# Patient Record
Sex: FEMALE | Race: WHITE | NOT HISPANIC OR LATINO | Employment: FULL TIME | URBAN - METROPOLITAN AREA
[De-identification: names, ages, dates, MRNs, and addresses within clinical notes are randomized per-mention and may not be internally consistent; named-entity substitution may affect disease eponyms.]

---

## 2024-04-11 ENCOUNTER — OFFICE VISIT (OUTPATIENT)
Dept: FAMILY MEDICINE | Facility: CLINIC | Age: 29
End: 2024-04-11
Payer: COMMERCIAL

## 2024-04-11 ENCOUNTER — PATIENT OUTREACH (OUTPATIENT)
Dept: ONCOLOGY | Facility: CLINIC | Age: 29
End: 2024-04-11

## 2024-04-11 VITALS
RESPIRATION RATE: 16 BRPM | HEART RATE: 79 BPM | OXYGEN SATURATION: 97 % | BODY MASS INDEX: 23.23 KG/M2 | WEIGHT: 131.1 LBS | TEMPERATURE: 98.4 F | SYSTOLIC BLOOD PRESSURE: 126 MMHG | HEIGHT: 63 IN | DIASTOLIC BLOOD PRESSURE: 75 MMHG

## 2024-04-11 DIAGNOSIS — Z80.3 FAMILY HISTORY OF MALIGNANT NEOPLASM OF BREAST: ICD-10-CM

## 2024-04-11 DIAGNOSIS — Z00.00 ROUTINE GENERAL MEDICAL EXAMINATION AT A HEALTH CARE FACILITY: Primary | ICD-10-CM

## 2024-04-11 DIAGNOSIS — Z11.59 NEED FOR HEPATITIS C SCREENING TEST: ICD-10-CM

## 2024-04-11 DIAGNOSIS — Z12.4 CERVICAL CANCER SCREENING: ICD-10-CM

## 2024-04-11 DIAGNOSIS — F41.9 ANXIETY: ICD-10-CM

## 2024-04-11 DIAGNOSIS — Z11.4 SCREENING FOR HIV (HUMAN IMMUNODEFICIENCY VIRUS): ICD-10-CM

## 2024-04-11 LAB — T PALLIDUM AB SER QL: NONREACTIVE

## 2024-04-11 PROCEDURE — 99213 OFFICE O/P EST LOW 20 MIN: CPT | Mod: 25 | Performed by: FAMILY MEDICINE

## 2024-04-11 PROCEDURE — 90715 TDAP VACCINE 7 YRS/> IM: CPT | Performed by: FAMILY MEDICINE

## 2024-04-11 PROCEDURE — 86780 TREPONEMA PALLIDUM: CPT | Performed by: FAMILY MEDICINE

## 2024-04-11 PROCEDURE — 87389 HIV-1 AG W/HIV-1&-2 AB AG IA: CPT | Performed by: FAMILY MEDICINE

## 2024-04-11 PROCEDURE — 87591 N.GONORRHOEAE DNA AMP PROB: CPT | Performed by: FAMILY MEDICINE

## 2024-04-11 PROCEDURE — 36415 COLL VENOUS BLD VENIPUNCTURE: CPT | Performed by: FAMILY MEDICINE

## 2024-04-11 PROCEDURE — 99385 PREV VISIT NEW AGE 18-39: CPT | Mod: 25 | Performed by: FAMILY MEDICINE

## 2024-04-11 PROCEDURE — 90471 IMMUNIZATION ADMIN: CPT | Performed by: FAMILY MEDICINE

## 2024-04-11 PROCEDURE — 87491 CHLMYD TRACH DNA AMP PROBE: CPT | Performed by: FAMILY MEDICINE

## 2024-04-11 PROCEDURE — G0145 SCR C/V CYTO,THINLAYER,RESCR: HCPCS | Performed by: FAMILY MEDICINE

## 2024-04-11 PROCEDURE — 86803 HEPATITIS C AB TEST: CPT | Performed by: FAMILY MEDICINE

## 2024-04-11 RX ORDER — CITALOPRAM HYDROBROMIDE 10 MG/1
10 TABLET ORAL DAILY
Qty: 90 TABLET | Refills: 0 | Status: SHIPPED | OUTPATIENT
Start: 2024-04-11 | End: 2024-07-07

## 2024-04-11 RX ORDER — LEVETIRACETAM 250 MG/1
200 TABLET ORAL 2 TIMES DAILY
COMMUNITY

## 2024-04-11 SDOH — HEALTH STABILITY: PHYSICAL HEALTH: ON AVERAGE, HOW MANY DAYS PER WEEK DO YOU ENGAGE IN MODERATE TO STRENUOUS EXERCISE (LIKE A BRISK WALK)?: 1 DAY

## 2024-04-11 ASSESSMENT — PAIN SCALES - GENERAL: PAINLEVEL: NO PAIN (0)

## 2024-04-11 ASSESSMENT — SOCIAL DETERMINANTS OF HEALTH (SDOH): HOW OFTEN DO YOU GET TOGETHER WITH FRIENDS OR RELATIVES?: MORE THAN THREE TIMES A WEEK

## 2024-04-11 NOTE — NURSING NOTE
Prior to immunization administration, verified patients identity using patient s name and date of birth. Please see Immunization Activity for additional information.     Screening Questionnaire for Adult Immunization    Are you sick today?   No   Do you have allergies to medications, food, a vaccine component or latex?   No   Have you ever had a serious reaction after receiving a vaccination?   No   Do you have a long-term health problem with heart, lung, kidney, or metabolic disease (e.g., diabetes), asthma, a blood disorder, no spleen, complement component deficiency, a cochlear implant, or a spinal fluid leak?  Are you on long-term aspirin therapy?   No   Do you have cancer, leukemia, HIV/AIDS, or any other immune system problem?   No   Do you have a parent, brother, or sister with an immune system problem?   No   In the past 3 months, have you taken medications that affect  your immune system, such as prednisone, other steroids, or anticancer drugs; drugs for the treatment of rheumatoid arthritis, Crohn s disease, or psoriasis; or have you had radiation treatments?   No   Have you had a seizure, or a brain or other nervous system problem?   No   During the past year, have you received a transfusion of blood or blood    products, or been given immune (gamma) globulin or antiviral drug?   No   For women: Are you pregnant or is there a chance you could become       pregnant during the next month?   No   Have you received any vaccinations in the past 4 weeks?   No     Immunization questionnaire answers were all negative.      Patient instructed to remain in clinic for 15 minutes afterwards, and to report any adverse reactions.     Screening performed by Natan Peres MA on 4/11/2024 at 12:22 PM.

## 2024-04-11 NOTE — PATIENT INSTRUCTIONS
Preventive Care Advice   This is general advice given by our system to help you stay healthy. However, your care team may have specific advice just for you. Please talk to your care team about your preventive care needs.  Nutrition  Eat 5 or more servings of fruits and vegetables each day.  Try wheat bread, brown rice and whole grain pasta (instead of white bread, rice, and pasta).  Get enough calcium and vitamin D. Check the label on foods and aim for 100% of the RDA (recommended daily allowance).  Lifestyle  Exercise at least 150 minutes each week   (30 minutes a day, 5 days a week).  Do muscle strengthening activities 2 days a week. These help control your weight and prevent disease.  No smoking.  Wear sunscreen to prevent skin cancer.  Have a dental exam and cleaning every 6 months.  Yearly exams  See your health care team every year to talk about:  Any changes in your health.  Any medicines your care team has prescribed.  Preventive care, family planning, and ways to prevent chronic diseases.  Shots (vaccines)   HPV shots (up to age 26), if you've never had them before.  Hepatitis B shots (up to age 59), if you've never had them before.  COVID-19 shot: Get this shot when it's due.  Flu shot: Get a flu shot every year.  Tetanus shot: Get a tetanus shot every 10 years.  Pneumococcal, hepatitis A, and RSV shots: Ask your care team if you need these based on your risk.  Shingles shot (for age 50 and up).  General health tests  Diabetes screening:  Starting at age 35, Get screened for diabetes at least every 3 years.  If you are younger than age 35, ask your care team if you should be screened for diabetes.  Cholesterol test: At age 39, start having a cholesterol test every 5 years, or more often if advised.  Bone density scan (DEXA): At age 50, ask your care team if you should have this scan for osteoporosis (brittle bones).  Hepatitis C: Get tested at least once in your life.  STIs (sexually transmitted  infections)  Before age 24: Ask your care team if you should be screened for STIs.  After age 24: Get screened for STIs if you're at risk. You are at risk for STIs (including HIV) if:  You are sexually active with more than one person.  You don't use condoms every time.  You or a partner was diagnosed with a sexually transmitted infection.  If you are at risk for HIV, ask about PrEP medicine to prevent HIV.  Get tested for HIV at least once in your life, whether you are at risk for HIV or not.  Cancer screening tests  Cervical cancer screening: If you have a cervix, begin getting regular cervical cancer screening tests at age 21. Most people who have regular screenings with normal results can stop after age 65. Talk about this with your provider.  Breast cancer scan (mammogram): If you've ever had breasts, begin having regular mammograms starting at age 40. This is a scan to check for breast cancer.  Colon cancer screening: It is important to start screening for colon cancer at age 45.  Have a colonoscopy test every 10 years (or more often if you're at risk) Or, ask your provider about stool tests like a FIT test every year or Cologuard test every 3 years.  To learn more about your testing options, visit: https://www.[employer]/860597.pdf.  For help making a decision, visit: https://bit.ly/pb50786.  Prostate cancer screening test: If you have a prostate and are age 55 to 69, ask your provider if you would benefit from a yearly prostate cancer screening test.  Lung cancer screening: If you are a current or former smoker age 50 to 80, ask your care team if ongoing lung cancer screenings are right for you.  For informational purposes only. Not to replace the advice of your health care provider. Copyright   2023 [employer]. All rights reserved. Clinically reviewed by the [facility]. [employer] [id number] - REV 01/24.    Learning About Stress  What is stress?     Stress is your  body's response to a hard situation. Your body can have a physical, emotional, or mental response. Stress is a fact of life for most people, and it affects everyone differently. What causes stress for you may not be stressful for someone else.  A lot of things can cause stress. You may feel stress when you go on a job interview, take a test, or run a race. This kind of short-term stress is normal and even useful. It can help you if you need to work hard or react quickly. For example, stress can help you finish an important job on time.  Long-term stress is caused by ongoing stressful situations or events. Examples of long-term stress include long-term health problems, ongoing problems at work, or conflicts in your family. Long-term stress can harm your health.  How does stress affect your health?  When you are stressed, your body responds as though you are in danger. It makes hormones that speed up your heart, make you breathe faster, and give you a burst of energy. This is called the fight-or-flight stress response. If the stress is over quickly, your body goes back to normal and no harm is done.  But if stress happens too often or lasts too long, it can have bad effects. Long-term stress can make you more likely to get sick, and it can make symptoms of some diseases worse. If you tense up when you are stressed, you may develop neck, shoulder, or low back pain. Stress is linked to high blood pressure and heart disease.  Stress also harms your emotional health. It can make you lorenzo, tense, or depressed. Your relationships may suffer, and you may not do well at work or school.  What can you do to manage stress?  You can try these things to help manage stress:   Do something active. Exercise or activity can help reduce stress. Walking is a great way to get started. Even everyday activities such as housecleaning or yard work can help.  Try yoga or christopher chi. These techniques combine exercise and meditation. You may need  some training at first to learn them.  Do something you enjoy. For example, listen to music or go to a movie. Practice your hobby or do volunteer work.  Meditate. This can help you relax, because you are not worrying about what happened before or what may happen in the future.  Do guided imagery. Imagine yourself in any setting that helps you feel calm. You can use online videos, books, or a teacher to guide you.  Do breathing exercises. For example:  From a standing position, bend forward from the waist with your knees slightly bent. Let your arms dangle close to the floor.  Breathe in slowly and deeply as you return to a standing position. Roll up slowly and lift your head last.  Hold your breath for just a few seconds in the standing position.  Breathe out slowly and bend forward from the waist.  Let your feelings out. Talk, laugh, cry, and express anger when you need to. Talking with supportive friends or family, a counselor, or a luh leader about your feelings is a healthy way to relieve stress. Avoid discussing your feelings with people who make you feel worse.  Write. It may help to write about things that are bothering you. This helps you find out how much stress you feel and what is causing it. When you know this, you can find better ways to cope.  What can you do to prevent stress?  You might try some of these things to help prevent stress:  Manage your time. This helps you find time to do the things you want and need to do.  Get enough sleep. Your body recovers from the stresses of the day while you are sleeping.  Get support. Your family, friends, and community can make a difference in how you experience stress.  Limit your news feed. Avoid or limit time on social media or news that may make you feel stressed.  Do something active. Exercise or activity can help reduce stress. Walking is a great way to get started.  Where can you learn more?  Go to https://www.healthwise.net/patiented  Enter N032 in the  "search box to learn more about \"Learning About Stress.\"  Current as of: October 24, 2023               Content Version: 14.0    4678-7470 [employer].   Care instructions adapted under license by your healthcare professional. If you have questions about a medical condition or this instruction, always ask your healthcare professional. [employer] disclaims any warranty or liability for your use of this information.      Substance Use Disorder: Care Instructions  Overview     You can improve your life and health by stopping your use of alcohol or drugs. When you don't drink or use drugs, you may feel and sleep better. You may get along better with your family, friends, and coworkers. There are medicines and programs that can help with substance use disorder.  How can you care for yourself at home?  Here are some ways to help you stay sober and prevent relapse.  If you have been given medicine to help keep you sober or reduce your cravings, be sure to take it exactly as prescribed.  Talk to your doctor about programs that can help you stop using drugs or drinking alcohol.  Do not keep alcohol or drugs in your home.  Plan ahead. Think about what you'll say if other people ask you to drink or use drugs. Try not to spend time with people who drink or use drugs.  Use the time and money spent on drinking or drugs to do something that's important to you.  Preventing a relapse  Have a plan to deal with relapse. Learn to recognize changes in your thinking that lead you to drink or use drugs. Get help before you start to drink or use drugs again.  Try to stay away from situations, friends, or places that may lead you to drink or use drugs.  If you feel the need to drink alcohol or use drugs again, seek help right away. Call a trusted friend or family member. Some people get support from organizations such as Narcotics Anonymous or [employer] or from treatment facilities.  If you relapse, get help " as soon as you can. Some people make a plan with another person that outlines what they want that person to do for them if they relapse. The plan usually includes how to handle the relapse and who to notify in case of relapse.  Don't give up. Remember that a relapse doesn't mean that you have failed. Use the experience to learn the triggers that lead you to drink or use drugs. Then quit again. Recovery is a lifelong process. Many people have several relapses before they are able to quit for good.  Follow-up care is a key part of your treatment and safety. Be sure to make and go to all appointments, and call your doctor if you are having problems. It's also a good idea to know your test results and keep a list of the medicines you take.  When should you call for help?   Call 911  anytime you think you may need emergency care. For example, call if you or someone else:    Has overdosed or has withdrawal signs. Be sure to tell the emergency workers that you are or someone else is using or trying to quit using drugs. Overdose or withdrawal signs may include:  Losing consciousness.  Seizure.  Seeing or hearing things that aren't there (hallucinations).     Is thinking or talking about suicide or harming others.   Where to get help 24 hours a day, 7 days a week   If you or someone you know talks about suicide, self-harm, a mental health crisis, a substance use crisis, or any other kind of emotional distress, get help right away. You can:    Call the Suicide and Crisis Lifeline at 987.     Call 9-490-303-TALK (1-429.281.1401).     Text HOME to 897126 to access the Crisis Text Line.   Consider saving these numbers in your phone.  Go to [employer] 11line.org for more information or to chat online.  Call your doctor now or seek immediate medical care if:    You are having withdrawal symptoms. These may include nausea or vomiting, sweating, shakiness, and anxiety.   Watch closely for changes in your health, and be sure to contact  "your doctor if:    You have a relapse.     You need more help or support to stop.   Where can you learn more?  Go to https://www.[employer].net/patiented  Enter H573 in the search box to learn more about \"Substance Use Disorder: Care Instructions.\"  Current as of: November 15, 2023               Content Version: 14.0    1844-0977 [employer].   Care instructions adapted under license by your healthcare professional. If you have questions about a medical condition or this instruction, always ask your healthcare professional. [employer] disclaims any warranty or liability for your use of this information.      Safer Sex: Care Instructions  Overview  Safer sex is a way to reduce your risk of getting a sexually transmitted infection (STI). It can also help prevent pregnancy.  Several products can help you practice safer sex and reduce your chance of STIs. One of the best is a condom. There are internal and external condoms. You can use a special rubber sheet (dental dam) for protection during oral sex. Disposable gloves can keep your hands from touching blood, semen, or other body fluids that can carry infections.  Remember that birth control methods such as diaphragms, IUDs, foams, and birth control pills do not stop you from getting STIs.  Follow-up care is a key part of your treatment and safety. Be sure to make and go to all appointments, and call your doctor if you are having problems. It's also a good idea to know your test results and keep a list of the medicines you take.  How can you care for yourself at home?  Think about getting vaccinated to help prevent hepatitis A, hepatitis B, and human papillomavirus (HPV). They can be spread through sex.  Use a condom every time you have sex. Use an external condom, which goes on the penis. Or use an internal condom, which goes into the vagina or anus.  Make sure you use the right size external condom. A condom that's too small can break " "easily. A condom that's too big can slip off during sex.  Use a new condom each time you have sex. Be careful not to poke a hole in the condom when you open the wrapper.  Don't use an internal condom and an external condom at the same time.  Never use petroleum jelly (such as Vaseline), grease, hand lotion, baby oil, or anything with oil in it. These products can make holes in the condom.  After intercourse, hold the edge of the condom as you remove it. This will help keep semen from spilling out of the condom.  Do not have sex with anyone who has symptoms of an STI, such as sores on the genitals or mouth.  Do not drink a lot of alcohol or use drugs before sex.  Limit your sex partners. Sex with one partner who has sex only with you can reduce your risk of getting an STI.  Don't share sex toys. But if you do share them, use a condom and clean the sex toys between each use.  Talk to any partners before you have sex. Talk about what you feel comfortable with and whether you have any boundaries with sex. And find out if your partner or partners may be at risk for any STI. Keep in mind that a person may be able to spread an STI even if they do not have symptoms. You and any partners may want to get tested for STIs.  Where can you learn more?  Go to https://www.[employer].net/patiented  Enter B608 in the search box to learn more about \"Safer Sex: Care Instructions.\"  Current as of: November 27, 2023               Content Version: 14.0    5410-9055 [employer].   Care instructions adapted under license by your healthcare professional. If you have questions about a medical condition or this instruction, always ask your healthcare professional. [employer] disclaims any warranty or liability for your use of this information.      "

## 2024-04-11 NOTE — PROGRESS NOTES
Preventive Care Visit  [facility]  [place], Family Medicine  Apr 11, 2024      Assessment & Plan   Adult Preventative Visit  Patient presents for annual physical. TDAP immunization given today.    Cervical cancer screening  Patient has never had a Pap. No family history of cervical cancer. Pap collected today.   - Pap Screen reflex to HPV if ASCUS - recommend age 25 - 29    Anxiety  Patient reports struggling with anxiety for many years. Follows with a therapist regularly which is helpful. Patient is interested in medication management and this was discussed. Patient's sister is currently on citalopram and is helpful for her, so would like to start citalopram. Discussed side effects of medication.   - citalopram (CELEXA) 10 MG tablet; Take 1 tablet (10 mg) by mouth daily  - Follow-up in telephone or virtual visit in 1 month     Family history of malignant neoplasm of breast  Significant family history of breast cancer in mother and maternal grandmother. Patient's mother has had genetic testing, but patient is unsure of the results. Discussed referral to genetic counselor to aid in gathering and discussing those results. Patient was in agreement. Patient denies any concerns regarding their breast health. Discussed early mammogram screening given family history of breast cancer.   - Adult Oncology/Hematology  Referral; Future    Screening for HIV (human immunodeficiency virus)  - HIV Antigen Antibody Combo; Future  - NEISSERIA GONORRHOEA PCR  - CHLAMYDIA TRACHOMATIS PCR  - Treponema Abs w Reflex to RPR and Titer; Future  - HIV Antigen Antibody Combo  - Treponema Abs w Reflex to RPR and Titer    Need for hepatitis C screening test  - Hepatitis C Screen Reflex to HCV RNA Quant and Genotype; Future  - Hepatitis C Screen Reflex to HCV RNA Quant and Genotype    Patient has been advised of split billing requirements and indicates understanding: Yes      Counseling  Appropriate  preventive services were discussed with this patient, including applicable screening as appropriate for fall prevention, nutrition, physical activity, Tobacco-use cessation, weight loss and cognition.  Checklist reviewing preventive services available has been given to the patient.  Reviewed patient's diet, addressing concerns and/or questions.   She is at risk for lack of exercise and has been provided with information to increase physical activity for the benefit of her well-being.   The patient was instructed to see the dentist every 6 months.     MEDICATIONS:        - Start taking Citalopram 10mg daily        - Continue other medications without change  FUTURE APPOINTMENTS:       - Follow-up virtual or telephone visit in 1 month     Subjective   Theresa is a 29 year old, presenting for the following:  Physical        4/11/2024    10:45 AM   Additional Questions   Roomed by [facility] Care Directive  Patient does not have a Health Care Directive or Living Will.     HPI  Theresa presents to clinic for a physical. Reports she is generally quite healthy. History of epilepsy. Seizures are currently well controlled on Keppra and Lamictal. Reports last seizure was in the 8th grade. Believes she has a history of grand mal seizures. Follows with a neurologist, but unfortunately those records are not available to review today.     Theresa reports concerns regarding anxiety. Struggled with anxiety for many years. Reports chest tightness, spiraling thoughts, restlessness. Has been seeing a therapist for 1.5 years which is helpful. Believes she has had panic attacks before. Would be interested in starting anti-anxiety medication. Her sister recently started citalopram for anxiety and would be interested in beginning that.     Endorses history of recurrent UTIs. Last UTI was 1 year ago. Saw a urologist for work-up and was unremarkable based on patient's recollection. Could not find a trigger to recurrent UTIs. Denies  "urinary frequency, pain with urinating, or blood in the urine.       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 Other surgical history significant for wisdom teeth removal.     Family history significant for breast cancer in mother and maternal grandmother. Patient reports her mother had genetic testing about 1 year ago, but could not remember the results.         4/11/2024   General Health   How would you rate your overall physical health? Good   Feel stress (tense, anxious, or unable to sleep) To some extent   (!) STRESS CONCERN      4/11/2024   Nutrition   Three or more servings of calcium each day? Yes   Diet: Regular (no restrictions)   How many servings of fruit and vegetables per day? (!) 2-3   How many sweetened beverages each day? 0-1         4/11/2024   Exercise   Days per week of moderate/strenous exercise 1 day   (!) EXERCISE CONCERN      4/11/2024   Social Factors   Frequency of gathering with friends or relatives More than three times a week   Worry food won't last until get money to buy more No   Food not last or not have enough money for food? No   Do you have housing?  Yes   Are you worried about losing your housing? No   Lack of transportation? No   Unable to get utilities (heat,electricity)? No         4/11/2024   Dental   Dentist two times every year? (!) NO         4/11/2024   TB Screening   Were you born outside of the [place]? No     Today's PHQ-2 Score:       4/11/2024    10:46 AM   PHQ-2 ( 1999 [employer])   Q1: Little interest or pleasure in doing things 1   Q2: Feeling down, depressed or hopeless 1   PHQ-2 Score 2   Q1: Little interest or pleasure in doing things Several days   Q2: Feeling down, depressed or hopeless Several days   PHQ-2 Score 2         4/11/2024   Substance Use   Alcohol more than 3/day or more than 7/wk No   Do you use any other substances recreationally? (!) ALCOHOL     Social History     Tobacco Use    Smoking status: Never " "   Smokeless tobacco: Never   Substance Use Topics    Alcohol use: Yes     Comment: 5-6 beers per week    Drug use: Never          Mammogram Screening - Patient under 40 years of age: Routine Mammogram Screening not recommended.        4/11/2024   One time HIV Screening   Previous HIV test? No         4/11/2024   STI Screening   New sexual partner(s) since last STI/HIV test? (!) YES      History of abnormal Pap smear: NO - age 21-29 PAP every 3 years recommended         4/11/2024   Contraception/Family Planning   Questions about contraception or family planning No        Reviewed and updated as needed this visit by Provider    Allergies  Meds              Allergies: No known drug allergies.   Past medical history: Recurrent UTIs, accessory kidney s/p removal   Surgical history: Removal of part of kidney; wisdom teeth removal     Review of Systems  CONSTITUTIONAL: NEGATIVE for fever, chills, change in weight  ENT/MOUTH: NEGATIVE for ear, mouth and throat problems  RESP: NEGATIVE for significant cough or SOB  CV: NEGATIVE for chest pain, palpitations or peripheral edema  GI: NEGATIVE for nausea, abdominal pain, heartburn, or change in bowel habits  : normal menstrual cycles, negative for, dysuria, hematuria, urgency, vaginal discharge, or bleeding  MUSCULOSKELETAL: NEGATIVE for significant arthralgias or myalgia  NEURO: NEGATIVE for weakness, dizziness or paresthesias  PSYCHIATRIC: NEGATIVE for appetite disturbance, concentration difficulty, depressed mood, hallucinations, hopelessness, and obsessive thoughts. POSITIVE for anxiety.      Objective    Exam  /75   Pulse 79   Temp 98.4  F (36.9  C) (Temporal)   Resp 16   Ht 1.593 m (5' 2.72\")   Wt 59.5 kg (131 lb 1.6 oz)   LMP 03/31/2024 (Within Days)   SpO2 97%   Breastfeeding No   BMI 23.43 kg/m     Estimated body mass index is 23.43 kg/m  as calculated from the following:    Height as of this encounter: 1.593 m (5' 2.72\").    Weight as of this " encounter: 59.5 kg (131 lb 1.6 oz).    Physical Exam  GENERAL: alert and in no distress. Appears anxious.   EYES: Eyes grossly normal to inspection, PERRL and conjunctivae and sclerae normal  HENT: ear canals and TM's normal, nose and mouth without ulcers or lesions  NECK: no adenopathy, no asymmetry, masses, or scars  RESP: lungs clear to auscultation - no rales, rhonchi or wheezes  BREAST: normal without masses, tenderness or nipple discharge and no palpable axillary masses or adenopathy  CV: regular rate and rhythm, normal S1 S2, no S3 or S4, no murmur, click or rub, no peripheral edema  ABDOMEN: soft, nontender, no hepatosplenomegaly, no masses and bowel sounds normal   (female): normal female external genitalia, normal urethral meatus, normal vaginal mucosa. Chlamydia and gonorrhea STI screen collected. Pap collected.   MS: no gross musculoskeletal defects noted, no edema  SKIN: no suspicious lesions or rashes.  NEURO: Mentation intact and speech normal  PSYCH: mentation appears normal, affect normal/bright  LYMPH: no cervical, supraclavicular, or axillary adenopathy    Chioma Jackson, MS3       Provider Disclosure:  I agree with above History, Review of Systems, Physical exam and Plan.  I have reviewed the content of the documentation and have edited it as needed. I have personally performed the services documented here and the documentation accurately represents those services and the decisions I have made.      Electronically signed by:       Signed Electronically by: Yamile De Los Santos MD

## 2024-04-11 NOTE — PROGRESS NOTES
Writer received referral to Cancer Risk Management/Genetic Counseling.    Referred for: family his breast cancer mother and grandmother      Referral reviewed for appropriate plan, and sent to New Patient Scheduling ([phone]) for completion.    Qi Soriano RN, BSN  Oncology New Patient Nurse Navigator   [facility]  [phone]

## 2024-04-12 LAB
C TRACH DNA SPEC QL NAA+PROBE: NEGATIVE
HCV AB SERPL QL IA: NONREACTIVE
HIV 1+2 AB+HIV1 P24 AG SERPL QL IA: NONREACTIVE
N GONORRHOEA DNA SPEC QL NAA+PROBE: NEGATIVE

## 2024-04-15 LAB
BKR LAB AP GYN ADEQUACY: NORMAL
BKR LAB AP GYN INTERPRETATION: NORMAL
BKR LAB AP HPV REFLEX: NORMAL
BKR LAB AP PREVIOUS ABNORMAL: NORMAL
PATH REPORT.COMMENTS IMP SPEC: NORMAL
PATH REPORT.COMMENTS IMP SPEC: NORMAL
PATH REPORT.RELEVANT HX SPEC: NORMAL

## 2024-07-05 DIAGNOSIS — F41.9 ANXIETY: ICD-10-CM

## 2024-07-07 RX ORDER — CITALOPRAM HYDROBROMIDE 10 MG/1
10 TABLET ORAL DAILY
Qty: 90 TABLET | Refills: 0 | Status: SHIPPED | OUTPATIENT
Start: 2024-07-07 | End: 2024-10-06

## 2024-08-28 NOTE — PROGRESS NOTES
Virtual Visit Details  Type of service:  Telephone Visit   Originating Location (pt. Location): Home  Distant Location (provider location):  Off-site    9/3/2024    Referring Provider: Yamile De Los Santos MD     Presenting Information:   I met with Theresa Echevarria today for her telephone genetic counseling visit through the Cancer Risk Management Program to discuss her family history of breast cancer. She is here today to review this history, cancer screening recommendations, and available genetic testing options.    Personal History:  Theresa is a 29 year old female. She does not have any personal history of cancer. In her hormonal-based medical history, she had her first menstrual period at age 13, does not have biological children, and is premenopausal. Theresa has her ovaries, fallopian tubes and uterus in place, and reports no ovarian cancer screening to date. She reports no history of hormone replacement therapy or oral contraceptive use.  She has not had a mammogram or colonoscopy. Theresa denies any history of dermatological exams. She does not regularly do any other cancer screening at this time. Theresa reported no history of nicotine or tobacco use and occasional alcohol use.    Family History: (Please see scanned pedigree for detailed family history information)  Theresa's mother was diagnosed with breast cancer in her early 40's. It was reported that she had genetic testing, however, a copy of her report was unavailable for review today. She is currently 64.  Maternal grandmother was diagnosed with breast cancer at an unknown age with recurrence in her 60's. She passed away at age 86.   Paternal grandmother was diagnosed with breast cancer in her 50's. She passed away at age 89.   Her maternal and paternal ethnicity is . There is no known Ashkenazi Yazdanism ancestry on either side of her family. There is no reported consanguinity.    Family Structure  Daughters: 0; Sons: 0  Sisters: 1;  Brothers: 1  Maternal aunts: 1; Maternal uncles: 3  Paternal aunts: 0; Paternal uncles: 3    Discussion:  Theresa's family history of breast cancer is suggestive of a hereditary cancer syndrome.  We briefly reviewed basic genetics principles. Many of the genes we are born with impact our risk of certain diseases, such as cancer.  When one of these genes is not working properly due to a mistake (known as a  mutation  or  variant ), this may lead to an increased risk of developing cancer.   We reviewed the features of sporadic, familial, and hereditary cancers.   Cancer is a common diagnosis which impacts many families. The vast majority of cancers are considered sporadic and not primarily due to an inherited factor. Individuals can develop cancer due to aging, chance events, environmental exposures or lifestyles.  Only ~5-10% of cancer diagnoses are thought to be caused by inheriting a mutation or variant within a single cancer susceptibility gene. Features typically seen in these high risk families include: cancers diagnosed under age 50, multiple relatives with similar cancers on the same side of the family, cancers in multiple generations, and relatives with certain rare cancers (i.e., male breast cancer).   We discussed the natural history and genetics of several hereditary cancer syndromes, including Hereditary Breast and Ovarian Cancer Syndrome (HBOC).   The most common cause of hereditary breast and ovarian cancer is HBOC, which is caused by mutations in the BRCA1 and BRCA2 genes. Individuals with HBOC syndrome are at increased risk for several different cancers including: female and male breast cancer, ovarian cancer, prostate cancer, melanoma, and pancreatic cancer. HBOC typically presents with multiple family members diagnosed with breast cancer before age 50 and/or ovarian cancer.   A detailed handout regarding information about HBOC and related hereditary cancer syndromes will be provided to Theresa via  [employer] and can be found in the after visit summary. Topics included: inheritance pattern, cancer risks, cancer screening recommendations, and also risks, benefits and limitations of testing.  In looking at Theresa's personal and family history, it is possible that a cancer susceptibility gene is present due to her mother diagnosed with breast cancer in her early 40's.  Based on her personal and family history, Theresa meets current National Comprehensive Cancer Network (NCCN) criteria for genetic testing of high-penetrance breast cancer susceptibility genes (including BRCA1, BRCA2, CDH1, PALB2, PTEN, STK11, and TP53).   Of note, Theresa meeting NCCN guidelines for genes associated with breast cancer is dependent on her mother's genetic testing results.   We discussed the importance of obtaining a copy of Theresa's mother's genetic testing results for review.   We discussed that there are three possible results of any genetic test: Positive meaning a harmful mutation was identified, variant of unknown significance (VUS) meaning a variation in one of the genes was identified but it is unclear how this impacts cancer risk in the family, and negative meaning no mutations were identified. Positive results are medically actionable and medical management and screening decisions are made based on these results. However, because it is unclear what, if any, risk is associated with VUS results, medical management decisions are NOT made based on this result alone.  Theresa's mother's genetic testing results remain uncertain. A review of her mother's results would aid in genetic testing decisions and provide a more comprehensive understanding of how to effectively manage Theresa's cancer risk. If her mother's results were negative, then genetic testing for Theresa may not be indicated, given the current family history. If her mother is indeed negative for mutations linked to breast cancer, it would suggest that Theresa did not  inherit a mutation in any of these genes. Nonetheless, even with negative parental testing, Theresa still carries some residual risk for breast cancer based on family history.  Of note, given there is no reported history suspicious of a hereditary cancer syndrome on her paternal side of the family, we would not suspect that Theresa would be at risk of inheriting mutations in cancer susceptibility genes from her father. Therefore, additional genetic testing based on her paternal family history is not indicated for Theresa and her siblings at this time.   As a copy of Theresa mother's genetic testing report is needed to determine if Theresa qualifies for genetic testing, no orders were placed today. Theresa verbalized understanding and stated that she planned to send me a copy via [employer] or email of her mother's test results. Once I have reviewed the results, we can discuss the impact of that information on her in more detail.   Screening recommendations based upon personal/family history:      Based on her personal and family history, Theresa has a 27.9-29.3% lifetime risk of developing breast cancer based on the TARAH model. As such, Theresa meets current National Comprehensive Cancer Network (NCCN) guidelines for high risk breast screening. This includes annual breast MRI in addition to annual mammogram beginning in a women's early 30's.  In addition, Theresa should be receiving clinical breast exams by her physician. We discussed that once Theresa is approaching her early 30's, she is encouraged to reach back out so we can reanalyze her risk to better assess her breast cancer screening recommendations. If Theresa is still above a 20% lifetime risk of developing breast cancer, Theresa could participate in our Cancer Risk Management Program in which our nursing specialist provides an individual screening plan and assists with medical management.  Other population cancer screening options, such as those recommended  by the American Cancer Society and the National Comprehensive Cancer Network (NCCN), are also appropriate for Theresa and her family. These screening recommendations may change if there are changes to Theresa's personal and/or family history of cancer.  Final screening recommendations should be made by each individual's managing physician.   Of note, these screening recommendations may change should Theresa elect to pursue genetic testing in the future.  Medical Management: If Theresa were to proceed with genetic testing in the future it may determine  additional cancer screening for which Theresa may qualify (i.e. mammogram and breast MRI, more frequent colonoscopies, more frequent dermatologic exams, etc.),  options for risk reducing surgeries Theresa could consider (i.e. bilateral mastectomy, surgery to remove her ovaries and/or uterus, etc.),    and targeted chemotherapies if she were to develop certain cancers in the future (i.e. immunotherapy for individuals with Neely syndrome, PARP inhibitors, etc.).   These recommendations and possible targeted chemotherapies would be discussed in detail if Theresa decided to purse genetic testing.     Plan:  1) Theresa planned to send me a copy of her mother's genetic test report. Once I have reviewed these results, we could discuss the impact of this information on her in more detail. Therefore, no genetic testing was ordered today.   2) If Theresa decides to purse with genetic testing, we will schedule an appointment to review testing options, logistics surrounding testing, and review the consent form. My contact information was provided.   3) Information regarding recommended screening, based upon the family history, was reviewed for Theresa.  4) Theresa was provided my contact information and encouraged to contact me with any questions and/or updates to her personal/family history of cancer.    Theresa is 29 year old and is being evaluated via a billable telephone  visit.    Time spent on phone: 33 minutes    ADDENDUM 10/7/2024  Theresa provided her mother's genetic testing report via email for review. Her [facility] the Common Hereditary Cancers panel. Theresa's mother tested negative for the following 47 genes: APC, DUDLEY, AXIN2, BARD1, BMPR1A, BRCA1, BRCA2, BRIP1, CDH1, CDK4, CDKN2A, CHEK2, CTNNA1, DICER1, EPCAM, GREM1, HOXB13, KIT, MEN1, MLH1, MSH2, MSH3, MSH6, MUTYH, NBN, NF1, NTHL1, PALB2, PDGFRA, PMS2, POLD1, POLE, PTEN, RAD50, RAD51C, RAD51D, SDHA, SDHB, SDHC, SDHD, SMAD4, SMARCA4, STK11, TP53, TSC1, TSC2, VHL.   We discussed that Theresa's mother did not pass on an identifiable mutation in these genes to her children based on this test result. Mutations in these genes do not skip generations.  Since Theresa's mother had comprehensive genetic testing for genes associated with breast cancer, no genetic testing is indicated for Theresa and her siblings for their maternal family history of breast cancer. Given there is no other reported history suspicious of a hereditary cancer syndrome on her paternal side of the family, we would not suspect that Theresa would be at risk of inheriting any other mutations in cancer susceptibility genes. Therefore, no genetic testing is indicated for Theresa and her siblings at this time.  We discussed the importance of Theresa contacting me if her personal or family history changes. This may modify our assessment regarding risk for a hereditary cancer syndrome and/or genetic testing options.  Screening recommendations based upon personal/family history:     Based on her personal and family history, Theresa has a 27.9% lifetime risk of developing breast cancer based on the TARAH model. As such, Theresa meets current National Comprehensive Cancer Network (NCCN) guidelines for high risk breast screening. This includes annual breast MRI in addition to annual mammogram beginning in a women's  early 30's. In addition, Theresa should be receiving clinical breast exams by her physician. We discussed that Theresa could participate in our Cancer Risk Management Program in which our nursing specialist provides an individual screening plan and assists with medical management.  Other population cancer screening options, such as those recommended by the American Cancer Society and the National Comprehensive Cancer Network (NCCN), are also appropriate for Theresa and her family. These screening recommendations may change if there are changes to Theresa's personal and/or family history of cancer. Final screening recommendations should be made by each individual's primary care provider.     Suzie Machado MS, [place]  Licensed, Certified Genetic Counselor  Phone: [phone]

## 2024-09-03 ENCOUNTER — VIRTUAL VISIT (OUTPATIENT)
Dept: ONCOLOGY | Facility: CLINIC | Age: 29
End: 2024-09-03
Attending: FAMILY MEDICINE
Payer: COMMERCIAL

## 2024-09-03 DIAGNOSIS — Z80.3 FAMILY HISTORY OF MALIGNANT NEOPLASM OF BREAST: Primary | ICD-10-CM

## 2024-09-03 PROCEDURE — 96040 HC GENETIC COUNSELING, EACH 30 MINUTES: CPT | Mod: TEL,95

## 2024-09-03 NOTE — NURSING NOTE
Current patient location: [facility]  [place] [address]    Is the patient currently in the state of [place]? YES    Visit mode:TELEPHONE    If the visit is dropped, the patient can be reconnected by: TELEPHONE VISIT: Phone number:   Telephone Information:   Mobile [phone]       Will anyone else be joining the visit? NO  (If patient encounters technical issues they should call [phone]    How would you like to obtain your AVS? MyChart    Are changes needed to the allergy or medication list? N/A    Are refills needed on medications prescribed by this physician? NO    Rooming Documentation:  Questionnaire(s) not done per department protocol      Reason for visit: Consult    Gladys DAYF

## 2024-09-03 NOTE — LETTER
Cancer Risk Management  Program Locations    [facility]  [facility]  [facility]  [facility]  Mailing Address  Cancer Risk Management Program  [facility]  [address]  [place] [address]    New patient appointments  [phone]    September 3, 2024    Theresa Echevarria  [address]  [facility] [address]    Dear Theresa,    It was a pleasure talking with you via your virtual genetic counseling visit on 9/3/2024.  Below is a copy of the progress note from that recent visit through the Cancer Risk Management Program.  If you have any additional questions, please feel free to contact me.    Referring Provider: Yamile De Los Santos MD     Presenting Information:   I met with Theresa Parthethanrhoda today for her telephone genetic counseling visit through the Cancer Risk Management Program to discuss her family history of breast cancer. She is here today to review this history, cancer screening recommendations, and available genetic testing options.    Personal History:  Theresa is a 29 year old female. She does not have any personal history of cancer. In her hormonal-based medical history, she had her first menstrual period at age 13, does not have biological children, and is premenopausal. Theresa has her ovaries, fallopian tubes and uterus in place, and reports no ovarian cancer screening to date. She reports no history of hormone replacement therapy or oral contraceptive use.  She has not had a mammogram or colonoscopy. Theresa denies any history of dermatological exams. She does not regularly do any other cancer screening at this time. Theresa reported no history of nicotine or tobacco use and occasional alcohol use.    Family History: (Please see scanned pedigree for detailed family history information)  Theresa's mother was diagnosed with  breast cancer in her early 40's. It was reported that she had genetic testing, however, a copy of her report was unavailable for review today. She is currently 64.  Maternal grandmother was diagnosed with breast cancer at an unknown age with recurrence in her 60's. She passed away at age 86.   Paternal grandmother was diagnosed with breast cancer in her 50's. She passed away at age 89.   Her maternal and paternal ethnicity is . There is no known Ashkenazi Gnosticist ancestry on either side of her family. There is no reported consanguinity.    Family Structure  Daughters: 0; Sons: 0  Sisters: 1; Brothers: 1  Maternal aunts: 1; Maternal uncles: 3  Paternal aunts: 0; Paternal uncles: 3    Discussion:  Theresa's family history of breast cancer is suggestive of a hereditary cancer syndrome.  We briefly reviewed basic genetics principles. Many of the genes we are born with impact our risk of certain diseases, such as cancer.  When one of these genes is not working properly due to a mistake (known as a  mutation  or  variant ), this may lead to an increased risk of developing cancer.   We reviewed the features of sporadic, familial, and hereditary cancers.   Cancer is a common diagnosis which impacts many families. The vast majority of cancers are considered sporadic and not primarily due to an inherited factor. Individuals can develop cancer due to aging, chance events, environmental exposures or lifestyles.  Only ~5-10% of cancer diagnoses are thought to be caused by inheriting a mutation or variant within a single cancer susceptibility gene. Features typically seen in these high risk families include: cancers diagnosed under age 50, multiple relatives with similar cancers on the same side of the family, cancers in multiple generations, and relatives with certain rare cancers (i.e., male breast cancer).   We discussed the natural history and genetics of several hereditary cancer syndromes, including Hereditary Breast  and Ovarian Cancer Syndrome (HBOC).   The most common cause of hereditary breast and ovarian cancer is HBOC, which is caused by mutations in the BRCA1 and BRCA2 genes. Individuals with HBOC syndrome are at increased risk for several different cancers including: female and male breast cancer, ovarian cancer, prostate cancer, melanoma, and pancreatic cancer. HBOC typically presents with multiple family members diagnosed with breast cancer before age 50 and/or ovarian cancer.   A detailed handout regarding information about HBOC and related hereditary cancer syndromes will be provided to Theresa via [employer] and can be found in the after visit summary. Topics included: inheritance pattern, cancer risks, cancer screening recommendations, and also risks, benefits and limitations of testing.  In looking at Theresa's personal and family history, it is possible that a cancer susceptibility gene is present due to her mother diagnosed with breast cancer in her early 40's.  Based on her personal and family history, Theresa meets current National Comprehensive Cancer Network (NCCN) criteria for genetic testing of high-penetrance breast cancer susceptibility genes (including BRCA1, BRCA2, CDH1, PALB2, PTEN, STK11, and TP53).   Of note, Theresa meeting NCCN guidelines for genes associated with breast cancer is dependent on her mother's genetic testing results.   We discussed the importance of obtaining a copy of Theresa's mother's genetic testing results for review.   We discussed that there are three possible results of any genetic test: Positive meaning a harmful mutation was identified, variant of unknown significance (VUS) meaning a variation in one of the genes was identified but it is unclear how this impacts cancer risk in the family, and negative meaning no mutations were identified. Positive results are medically actionable and medical management and screening decisions are made based on these results. However, because it  is unclear what, if any, risk is associated with VUS results, medical management decisions are NOT made based on this result alone.  Theresa's mother's genetic testing results remain uncertain. A review of her mother's results would aid in genetic testing decisions and provide a more comprehensive understanding of how to effectively manage Theresa's cancer risk. If her mother's results were negative, then genetic testing for Theresa may not be indicated, given the current family history. If her mother is indeed negative for mutations linked to breast cancer, it would suggest that Theresa did not inherit a mutation in any of these genes. Nonetheless, even with negative parental testing, Theresa still carries some residual risk for breast cancer based on family history.  Of note, given there is no reported history suspicious of a hereditary cancer syndrome on her paternal side of the family, we would not suspect that Theresa would be at risk of inheriting mutations in cancer susceptibility genes from her father. Therefore, additional genetic testing based on her paternal family history is not indicated for Theresa and her siblings at this time.   As a copy of Theresa mother's genetic testing report is needed to determine if Theresa qualifies for genetic testing, no orders were placed today. Theresa verbalized understanding and stated that she planned to send me a copy via [employer] or email of her mother's test results. Once I have reviewed the results, we can discuss the impact of that information on her in more detail.   Screening recommendations based upon personal/family history:      Based on her personal and family history, Theresa has a 27.9-29.3% lifetime risk of developing breast cancer based on the TARAH model. As such, Theresa meets current National Comprehensive Cancer Network (NCCN) guidelines for high risk breast screening. This includes annual breast MRI in addition to annual mammogram beginning in a  women's early 30's.  In addition, Theresa should be receiving clinical breast exams by her physician. We discussed that once Theresa is approaching her early 30's, she is encouraged to reach back out so we can reanalyze her risk to better assess her breast cancer screening recommendations. If Theresa is still above a 20% lifetime risk of developing breast cancer, Theresa could participate in our Cancer Risk Management Program in which our nursing specialist provides an individual screening plan and assists with medical management.  Other population cancer screening options, such as those recommended by the American Cancer Society and the National Comprehensive Cancer Network (NCCN), are also appropriate for Theresa and her family. These screening recommendations may change if there are changes to Theresa's personal and/or family history of cancer.  Final screening recommendations should be made by each individual's managing physician.   Of note, these screening recommendations may change should Theresa elect to pursue genetic testing in the future.  Medical Management: If Theresa were to proceed with genetic testing in the future it may determine  additional cancer screening for which Theresa may qualify (i.e. mammogram and breast MRI, more frequent colonoscopies, more frequent dermatologic exams, etc.),  options for risk reducing surgeries Theresa could consider (i.e. bilateral mastectomy, surgery to remove her ovaries and/or uterus, etc.),    and targeted chemotherapies if she were to develop certain cancers in the future (i.e. immunotherapy for individuals with Neely syndrome, PARP inhibitors, etc.).   These recommendations and possible targeted chemotherapies would be discussed in detail if Theresa decided to purse genetic testing.     Plan:  1) Theresa planned to send me a copy of her mother's genetic test report. Once I have reviewed these results, we could discuss the impact of this information on her in more  detail. Therefore, no genetic testing was ordered today.   2) If Theresa decides to purse with genetic testing, we will schedule an appointment to review testing options, logistics surrounding testing, and review the consent form. My contact information was provided.   3) Information regarding recommended screening, based upon the family history, was reviewed for Theresa.  4) Theresa was provided my contact information and encouraged to contact me with any questions and/or updates to her personal/family history of cancer.    Theresa is 29 year old and is being evaluated via a billable telephone visit.    Time spent on phone: 33 minutes    Suzie Machado MS, [facility]  Licensed, Certified Genetic Counselor  Phone: [phone]

## 2024-09-03 NOTE — Clinical Note
9/3/2024      Theresa Echevarria  [address]  [facility] [address]      Dear Colleague,    Thank you for referring your patient, Theresa Echevarria, to the [facility]. Please see a copy of my visit note below.    Virtual Visit Details  Type of service:  Telephone Visit   Originating Location (pt. Location): Home  Distant Location (provider location):  Off-site    9/3/2024    Referring Provider: Yamile De Los Santos MD     Presenting Information:   I met with Theresa Echevarria today for her telephone genetic counseling visit through the Cancer Risk Management Program to discuss her family history of breast cancer. She is here today to review this history, cancer screening recommendations, and available genetic testing options.    Personal History:  Theresa is a 29 year old female. She does not have any personal history of cancer. In her hormonal-based medical history, she had her first menstrual period at age 13, does not have biological children, and is premenopausal. Theresa has her ovaries, fallopian tubes and uterus in place, and reports no ovarian cancer screening to date. She reports no history of hormone replacement therapy or oral contraceptive use.  She has not had a mammogram or colonoscopy. Theresa denies any history of dermatological exams. She does not regularly do any other cancer screening at this time. Theresa reported no history of nicotine or tobacco use and occasional alcohol use.    Family History: (Please see scanned pedigree for detailed family history information)  Theresa's mother was diagnosed with breast cancer at age ***. It was reported that she had genetic testing.   Maternal grandmother was diagnosed with breast cancer in her ***.  Paternal grandmother was diagnosed with breast cancer in her ***.   Her maternal and paternal ethnicity is . There is no known Ashkenazi Christianity ancestry on either side of her family. There is no reported  consanguinity.    Family Structure  Daughters: 0; Sons: 0  Sisters: 1; Brothers: 1  Maternal aunts: 1; Maternal uncles: 3  Paternal aunts: 0; Paternal uncles: 3    Discussion:  Theresa's family history of breast cancer is suggestive of a hereditary cancer syndrome.  We briefly reviewed basic genetics principles. Many of the genes we are born with impact our risk of certain diseases, such as cancer.  When one of these genes is not working properly due to a mistake (known as a  mutation  or  variant ), this may lead to an increased risk of developing cancer.   We reviewed the features of sporadic, familial, and hereditary cancers.   Cancer is a common diagnosis which impacts many families. The vast majority of cancers are considered sporadic and not primarily due to an inherited factor. Individuals can develop cancer due to aging, chance events, environmental exposures or lifestyles.  Only ~5-10% of cancer diagnoses are thought to be caused by inheriting a mutation or variant within a single cancer susceptibility gene. Features typically seen in these high risk families include: cancers diagnosed under age 50, multiple relatives with similar cancers on the same side of the family, cancers in multiple generations, and relatives with certain rare cancers (i.e., male breast cancer).   We discussed the natural history and genetics of several hereditary cancer syndromes, including Hereditary Breast and Ovarian Cancer Syndrome (HBOC).   The most common cause of hereditary breast and ovarian cancer is HBOC, which is caused by mutations in the BRCA1 and BRCA2 genes. Individuals with HBOC syndrome are at increased risk for several different cancers including: female and male breast cancer, ovarian cancer, prostate cancer, melanoma, and pancreatic cancer. HBOC typically presents with multiple family members diagnosed with breast cancer before age 50 and/or ovarian cancer.   A detailed handout regarding information about HBOC  and related hereditary cancer syndromes will be provided to Theresa via [employer] and can be found in the after visit summary. Topics included: inheritance pattern, cancer risks, cancer screening recommendations, and also risks, benefits and limitations of testing.  In looking at Theresa's personal and family history, it is possible that a cancer susceptibility gene is present due to her mother diagnosed with breast cancer in her early 40's.  Based on her personal and family history, Theresa meets current National Comprehensive Cancer Network (NCCN) criteria for genetic testing of high-penetrance breast cancer susceptibility genes (including BRCA1, BRCA2, CDH1, PALB2, PTEN, STK11, and TP53).   Of note, Theresa meeting NCCN guidelines for genes associated with breast cancer is dependent on her mother's genetic testing results.   We discussed the importance of obtaining a copy of her mother's genetic testing results for review.   We discussed that there are three possible results of any genetic test: Positive meaning a harmful mutation was identified, variant of unknown significance (VUS) meaning a variation in one of the genes was identified but it is unclear how this impacts cancer risk in the family, and negative meaning no mutations were identified. Positive results are medically actionable and medical management and screening decisions are made based on these results. However, because it is unclear what, if any, risk is associated with VUS results, medical management decisions are NOT made based on this result alone.  Theresa's mother's genetic testing results remain uncertain. A review of her mother's results would aid in genetic testing decisions and provide a more comprehensive understanding of how to effectively manage Theresa's cancer risk. If her mother's results were truly negative, then genetic testing for Theresa may not be indicated, given the current family history. If her mother is indeed negative for  mutations linked to breast cancer, it would suggest that Theresa did not inherit a mutation in any of these genes. Nonetheless, even with negative parental testing, Theresa still carries some residual risk for breast cancer based on family history.  Of note, given there is no reported history suspicious of a hereditary cancer syndrome on her paternal side of the family, we would not suspect that Theresa would be at risk of inheriting mutations in cancer susceptibility genes from her father. Therefore, additional genetic testing based on her paternal family history is not indicated for Theresa and her siblings at this time.   As a copy of Theresa mother's genetic testing report is needed to determine if Theresa qualifies for genetic testing, no orders were placed today. Theresa verbalized understanding and stated that she planned to send me a copy via [employer] or email of her mother's test results. Once I have reviewed the results, we can discuss the impact of that information on her in more detail.   Screening recommendations based upon personal/family history:      Based on her personal and family history, Theresa has a 27.9-29.3% lifetime risk of developing breast cancer based on the TARAH model. As such, Theresa meets current National Comprehensive Cancer Network (NCCN) guidelines for high risk breast screening. This includes annual breast MRI in addition to annual mammogram beginning in a women's early 30's.  In addition, Theresa should be receiving clinical breast exams by her physician. We discussed that once Theresa is approaching her early 30's, she is encouraged to reach back out so we can reanalyze her risk to better assess her breast cancer screening recommendations. If Theresa is still above a 20% lifetime risk of developing breast cancer, Theresa could participate in our Cancer Risk Management Program in which our nursing specialist provides an individual screening plan and assists with medical  management.  Other population cancer screening options, such as those recommended by the American Cancer Society and the National Comprehensive Cancer Network (NCCN), are also appropriate for Theresa and her family. These screening recommendations may change if there are changes to Theresa's personal and/or family history of cancer.  Final screening recommendations should be made by each individual's managing physician.   Of note, these screening recommendations may change should Theresa elect to pursue genetic testing in the future.  Medical Management: If Theresa were to proceed with genetic testing in the future it may determine  additional cancer screening for which Theresa may qualify (i.e. mammogram and breast MRI, more frequent colonoscopies, more frequent dermatologic exams, etc.),  options for risk reducing surgeries Theresa could consider (i.e. bilateral mastectomy, surgery to remove her ovaries and/or uterus, etc.),    and targeted chemotherapies if she were to develop certain cancers in the future (i.e. immunotherapy for individuals with Neely syndrome, PARP inhibitors, etc.).   These recommendations and possible targeted chemotherapies would be discussed in detail if Theresa decided to purse genetic testing.     Plan:  1) Theresa planned to send me a copy of her mother's genetic test report. Once I have reviewed these results, we could discuss the impact of this information on her in more detail. Therefore, no genetic testing was ordered today.   2) If Theresa decides to purse with genetic testing, we will schedule an appointment to review testing options, logistics surrounding testing, and review the consent form. My contact information was provided.   3) Information regarding recommended screening, based upon the family history, was reviewed for Theresa.  4) Theresa was provided my contact information and encouraged to contact me with any questions and/or updates to her personal/family history of  cancer.    Theresa is 29 year old and is being evaluated via a billable telephone visit.    Time spent on phone: 33 minutes    Suzie Machado MS, [facility]  Licensed, Certified Genetic Counselor  Phone: [phone]      Again, thank you for allowing me to participate in the care of your patient.        Sincerely,        Suzie Machado, GC

## 2024-09-05 NOTE — PATIENT INSTRUCTIONS
Assessing Cancer Risk  Cancer is a common diagnosis which impacts many families.  Individuals may develop cancer due to environmental factors (such as exposures and lifestyle), aging, genetic predisposition, or a combination of these factors.      Only about 5-10% of cancers are thought to be due to an inherited cancer susceptibility gene.    These families often have:  Several people with the same or related types of cancer  Cancers diagnosed at a young age (before age 50)  Individuals with more than one primary cancer  Multiple generations of the family affected with cancer    Comprehensive Breast and Gynecologic Cancer Panel  We each inherit two copies of every gene in our bodies: one from our mother, and one from our father. Each gene has a specific job to do.  When a gene has a mistake or  mutation  in it, it does not work like it should.     Some people may be candidates for genetic testing of more than one gene.  For these families, genetic testing using a cancer panel may be offered. These panels will test different genes at once known to increase the risk for breast, ovarian, uterine, and/or other cancers.    This handout will review common hereditary breast and gynecologic cancer syndromes. The genes that will be discussed in this handout are: DUDLEY, BRCA1, BRCA2, BRIP1, CDH1, CHEK2, MLH1, MSH2, MSH6, PMS2, EPCAM, PTEN, PALB2, RAD51C, RAD51D, and TP53.    The purpose of this handout is to serve as a brief summary of the breast and gynecologic cancer risk genes that have published clinical management guidelines for individuals who are found to carry a mutation. Inheriting a mutation does not mean a person will develop cancer, but it does significantly increase their risk above the general population risk.     ______________________________________________________________________________    Hereditary Breast and Ovarian Cancer Syndrome (BRCA1 and BRCA2)  A single mutation in one of the copies of BRCA1 or  BRCA2 increases the risk for breast and ovarian cancer, among others.  The risk for pancreatic cancer and melanoma may also be slightly increased in some families.  The chart below shows the chance that someone with a BRCA mutation would develop cancer in his or her lifetime1,2,3,4.       Lifetime Cancer Risks    General Population BRCA1  BRCA2   Breast  12% >60% >60%   Ovarian  1-2% 39-58% 13-29%   Prostate 12% 7-26% 19-61%   Male Breast 0.1% 0.2-1.2% 1.8-7.1%   Pancreas 1-2% Up to 5% 5-10%     A person s ethnic background is also important to consider, as individuals of Ashkenazi Lutheran ancestry have a higher chance of having a BRCA gene mutation.  There are three BRCA mutations that occur more frequently in this population.      Neely Syndrome (MLH1, MSH2, MSH6, PMS2, and EPCAM)  Currently five genes are known to cause Neely Syndrome: MLH1, MSH2, MSH6, PMS2, and EPCAM.  A single mutation in one of the Neely Syndrome genes increases the risk for colon, endometrial, ovarian, and stomach cancers.  Other cancers that occur less commonly in Neely Syndrome include urinary tract, skin, and brain cancers.  The chart below shows the chance that a person with Neely syndrome would develop cancer in his or her lifetime5.      Lifetime Cancer Risks    General Population Neely Syndrome   Colon 5% 10-61%   Endometrial 3% 13-57%   Ovarian 1-2% 1-38%   Stomach <1% 1-9%   *Cancer risk varies depending on Neely syndrome gene found      Cowden Syndrome (PTEN)  Cowden syndrome is a hereditary condition that increases the risk for breast, thyroid, endometrial, colon, and kidney cancer.  Cowden syndrome is caused by a mutation in the PTEN gene.  A single mutation in one of the copies of PTEN causes Cowden syndrome and increases cancer risk.  The chart below shows the chance that someone with a PTEN mutation would develop cancer in their lifetime6,7.  Other benign features seen in some individuals with Cowden syndrome include benign  skin lesions (facial papules, keratoses, lipomas), learning disability, autism, thyroid nodules, colon polyps, and larger head size.     Lifetime Cancer Risks    General Population Cowden   Breast 12% 40-60%*   Thyroid 1% Up to 38%   Renal 1-2% Up to 35%   Endometrial 3% Up to 28%   Colon 5% Up to 9%   Melanoma 2-3% Up to 6%   *Emerging data suggests the risk for breast cancer could be greater than 60%               Li-Fraumeni Syndrome (TP53)  Li-Fraumeni Syndrome (LFS) is a cancer predisposition syndrome caused by a mutation in the TP53 gene. A single mutation in one of the copies of TP53 increases the risk for multiple cancers. Individuals with LFS are at an increased risk for developing cancer at a young age. The lifetime risk for development of a LFS-associated cancer is 50% by age 30 and 90% by age 60.   Core Cancers: Sarcomas, Breast, Brain, Lung, Leukemias/Lymphomas, Adrenocortical carcinomas  Other Cancers: Gastrointestinal, Thyroid, Skin, Genitourinary       Hereditary Diffuse Gastric Cancer (CDH1)  Currently, one gene is known to cause hereditary diffuse gastric cancer (HDGC): CDH1.  Individuals with HDGC are at increased risk for diffuse gastric cancer and lobular breast cancer. Of people diagnosed with HDGC, 30-50% have a mutation in the CDH1 gene.  This suggests there are likely other genes that may cause HDGC that have not been identified yet.      Lifetime Cancer Risks    General Population HDGC   Diffuse Gastric  <1% ~80%   Breast 12% 41-60%       Additional Genes    DUDLEY  DUDLEY is a moderate-risk breast cancer gene. Women who have a mutation in DUDLEY can have between a 2-4 fold increased risk for breast cancer compared to the general population8. DUDLEY mutations have also been associated with increased risk for pancreatic cancer between 5-10%9. Individuals who inherit two DUDLEY mutations have a condition called ataxia-telangiectasia (AT).  This rare autosomal recessive condition affects the nervous system  and immune system, and is associated with progressive cerebellar ataxia beginning in childhood. Individuals with ataxia-telangiectasia often have a weakened immune system and have an increased risk for childhood cancers.    PALB2  Mutations in PALB2 have been shown to increase the risk of breast cancer up to 41-60% in some families; where individuals fall within this risk range is dependent upon family [id number]. PALB2 mutations have also been associated with increased risk for pancreatic cancer between 5-10%.  Individuals who inherit two PALB2 mutations--one from their mother and one from their father--have a condition called Fanconi Anemia.  This rare autosomal recessive condition is associated with short stature, developmental delay, bone marrow failure, and increased risk for childhood cancers.    CHEK2   CHEK2 is a moderate-risk breast cancer gene.  Women who have a mutation in CHEK2 have around a 2-4 fold increased risk for breast cancer compared to the general population, and this risk may be higher depending upon family history.11,12,13 The risk of colon cancer may be twice as high as the general population risk of colon cancer of 5%. Mutations in CHEK2 have also been shown to increase the risk of other cancers, including prostate, however these cancer risks are currently not well understood.    BRIP1, RAD51C and RAD51D  Mutations in RAD51C and RAD51D have been shown to increase the risk of ovarian cancer and breast cancer 14,. Mutations in BRIP1 have been shown to increase the risk of ovarian cancer and possibly female breast cancer 15 .       Lifetime Cancer Risk    General Population        BRIP1   RAD51C  RAD51D   Breast 12% Not well defined 20-40% 20-40%   Ovarian 1-2% 5-15% 10-15% 10-20%     ______________________________________________________________  Inheritance  All of the cancer syndromes reviewed above are inherited in an autosomal dominant pattern.  This means that if a parent has a mutation,  each of their children will have a 50% chance of inheriting that same mutation. Therefore, each child --male or female-- would have a 50% chance of being at increased risk for developing cancer.    Image obtained from Genetics Home Reference, 2013     Mutations in some genes can occur de amarjit, which means that a person s mutation occurred for the first time in them and was not inherited from a parent.  Now that they have the mutation, however, it can be passed on to future generations.    Genetic Testing  Genetic testing involves a blood test and will look for any harmful mutations that are associated with increased cancer risk.  If possible, it is recommended that the person(s) who has had cancer be tested before other family members.  That person will give us the most useful information about whether or not a specific gene is associated with the cancer in the family.    Results  There are three possible results of genetic testing:  Positive--a harmful mutation was identified in one or more of the genes  Negative--no mutations were identified in any of the genes tested  Variant of unknown significance--a variation in one of the genes was identified, but it is unclear how this impacts cancer risk in the family    Advantages and Disadvantages   There are advantages and disadvantages to genetic testing.    Advantages  May clarify your cancer risk  Can help you make medical decisions  May explain the cancers in your family  May give useful information to your family members (if you share your results)    Disadvantages  Possible negative emotional impact of learning about inherited cancer risk  Uncertainty in interpreting a negative test result in some situations  Possible genetic discrimination concerns (see below)    Genetic Information Nondiscrimination Act (ALISTAIR)  The Genetic Information Nondiscrimination Act of 2008 (ALISTAIR) is a federal law that protects individuals from health insurance or employment discrimination  based on a genetic test result alone (with some exceptions, including employers with fewer than 15 employees, and ).  Although rare, ALISTAIR  does not cover discrimination protections in terms of life insurance, long term care, or disability insurances.  Visit the National Human PayAllies Research [place] website to learn more.    Reducing Cancer Risk  All of the genes described in this handout have nationally recognized cancer screening guidelines that would be recommended for individuals who test positive.  In addition to increased cancer screening, surgeries may be offered or recommended to reduce cancer risk.  Recommendations are based upon an individual s genetic test result as well as their personal and family history of cancer.    Questions to Think About Regarding Genetic Testing:  What effect will the test result have on me and my relationship with my family members if I have an inherited gene mutation?  If I don t have a gene mutation?  Should I share my test results, and how will my family react to this news, which may also affect them?  Are my children ready to learn new information that may one day affect their own health?    Hereditary Cancer Resources    FORCE: Facing Our Risk of Cancer Empowered facingourrisk.org   Bright Pink bebrightpink.org   Li-Fraumeni Syndrome Association lfsassociation.org   PTEN World PTENworld.com   No stomach for cancer, Inc. nostomachforcancer.org   Stomach cancer relief network Scrnet.org   Collaborative Group of the Americas on Inherited Colorectal Cancer (CGA) cgaicc.com    Cancer Care cancercare.org   American Cancer Society (ACS) cancer.org   National Cancer [place] (NCI) cancer.gov     Please call us if you have any questions or concerns.   Cancer Risk Management Program [phone]-CANCER ([phone])  Arnold Mantilla, MS [facility] [place]  [phone]  Suzie Machado, MS, [facility] [place] [phone]  Elsie Givens, MS, [facility] [place]  [phone]  Palak Jhaveri, MS, [facility] [place]  [phone]  Rivka Salazar,  [place], [facility] [place]  [phone]  Raysa Gomez, MS, [facility] [place] [phone]  Lidia Lindquist, MS, [facility] [place] [phone]    References  Louann Gilliam PDP, Estefany S, Chiki RODRIGEZ, Kelly JE, Moni JL, Peewee N, Eduardo H, Amanda O, Bal A, Pasini B, Radiclaude P, Mannicole S, Klaus DM, Engle N, Brandon E, Mallika H, Polanco E, Shahnaz J, Gronlayo J, Anand B, Tulinius H, Thorlacius S, Eerola H, Nevanlinna H, Arben K, Michael OP. Average risks of breast and ovarian cancer associated with BRCA1 or BRCA2 mutations detected in case series unselected for family history: a combined analysis of 222 studies. Am J Hum Yolis. 2003;72:1117-30.  Neris N, Mayra M, Gavino G.  BRCA1 and BRCA2 Hereditary Breast and Ovarian Cancer. Gene Reviews online. 2013.  Luke YC, Gemini S, Tong G, Leo S. Breast cancer risk among male BRCA1 and BRCA2 mutation carriers. J Natl Cancer Inst. 2007;99:1811-4.  Mike MORENO, Curt I, Shimon J, Ernestina E, Wen ER, Reginald F. Risk of breast cancer in male BRCA2 carriers. J Med Yolis. 2010;47:710-1.  National Comprehensive Cancer Network. Clinical practice guidelines in oncology, colorectal cancer screening. Available online (registration required). 2015.  Cl MH, Tyler J, Bruce J, Livia WHITE, Mani MS, Eng C. Lifetime cancer risks in individuals with germline PTEN mutations. Clin Cancer Res. 2012;18:400-7.  Radha R. Cowden Syndrome: A Critical Review of the Clinical Literature. J Yolis . 2009:18:13-27.  Rosalio ALBRIGHT, Vimal RIZVI, Dewey S, Rachelle P, Geneva T, Cele M, Lee B, Joshua H, Levy R, Melanie K, Jay L, Mike MORENO, Klaus RIZVI, Juventino DF, Terrell MR, The Breast Cancer Susceptibility Collaboration ([place]) & Christian MONZON. DUDLEY mutations that cause ataxia-telangiectasia are breast cancer susceptibility alleles. Nature Genetics. 2006;38:873-875  Chris N , Yasmeen Y, Liset J, Henry L, Sotero HAMEED , Dodie ML, Leonid S, Mari AG, Julio S, Yaritza ML, Candy J , Brenna R, Maru RODRIGUES, Venita  JR, El VE, Piotr M, Voadalstein B, Anh N, Vanessa RH, Nicho KW, and Martina AP. DUDLEY mutations in patients with hereditary pancreatic cancer. Cancer Discover. 2012;2:41-46  Josse NAVARRO., et al. Breast-Cancer Risk in Families with Mutations in PALB2. NEJM. 2014; 371(6):497-506.  CHEK2 Breast Cancer Case-Control Consortium. CHEK2*1100delC and susceptibility to breast cancer: A collaborative analysis involving 10,860 breast cancer cases and 9,065 controls from 10 studies. Am J Hum Yolis, 74 (2004), pp. 1681-0172  Christen T, Guerda S, Susan K, et al. Spectrum of Mutations in BRCA1, BRCA2, CHEK2, and TP53 in Families at High Risk of Breast Cancer. ELADIO. 2006;295(12):5134-1168.   Ade C, Eleanor D, Jose ALBRIGHT, et al. Risk of breast cancer in women with a CHEK2 mutation with and without a family history of breast cancer. J Clin Oncol. 2011;29:8316-7799.  Song H, Allens E, Ramus SJ, et al. Contribution of germline mutations in the RAD51B, RAD51C, and RAD51D genes to ovarian cancer in the population. J Clin Oncol. 2015;33(26):4106-5901. Doi:10.1200/JCO.2015.61.2408.  Nik T, Felipe DF, Janet P, et al. Mutations in BRIP1 confer high risk of ovarian cancer. Sadie Yolis. 2011;43(11):4778-2505. doi:10.1038/ng.955.

## 2024-10-06 ENCOUNTER — MYC REFILL (OUTPATIENT)
Dept: FAMILY MEDICINE | Facility: CLINIC | Age: 29
End: 2024-10-06
Payer: COMMERCIAL

## 2024-10-06 DIAGNOSIS — F41.9 ANXIETY: ICD-10-CM

## 2024-10-07 RX ORDER — CITALOPRAM HYDROBROMIDE 10 MG/1
10 TABLET ORAL DAILY
Qty: 90 TABLET | Refills: 1 | Status: SHIPPED | OUTPATIENT
Start: 2024-10-07

## 2025-01-07 ENCOUNTER — TRANSFERRED RECORDS (OUTPATIENT)
Dept: HEALTH INFORMATION MANAGEMENT | Facility: CLINIC | Age: 30
End: 2025-01-07
Payer: COMMERCIAL

## 2025-04-10 DIAGNOSIS — F41.9 ANXIETY: ICD-10-CM

## 2025-04-10 RX ORDER — CITALOPRAM HYDROBROMIDE 10 MG/1
TABLET ORAL
Qty: 90 TABLET | Refills: 0 | Status: SHIPPED | OUTPATIENT
Start: 2025-04-10

## 2025-05-18 ENCOUNTER — HEALTH MAINTENANCE LETTER (OUTPATIENT)
Age: 30
End: 2025-05-18

## 2025-07-16 DIAGNOSIS — F41.9 ANXIETY: ICD-10-CM

## 2025-07-16 RX ORDER — CITALOPRAM HYDROBROMIDE 10 MG/1
TABLET ORAL
Qty: 90 TABLET | Refills: 0 | OUTPATIENT
Start: 2025-07-16